# Patient Record
Sex: FEMALE | Race: WHITE | URBAN - METROPOLITAN AREA
[De-identification: names, ages, dates, MRNs, and addresses within clinical notes are randomized per-mention and may not be internally consistent; named-entity substitution may affect disease eponyms.]

---

## 2023-07-19 ENCOUNTER — OFFICE VISIT (OUTPATIENT)
Dept: ENDOCRINOLOGY | Age: 53
End: 2023-07-19
Payer: COMMERCIAL

## 2023-07-19 VITALS
WEIGHT: 116.2 LBS | HEART RATE: 64 BPM | HEIGHT: 63 IN | SYSTOLIC BLOOD PRESSURE: 113 MMHG | BODY MASS INDEX: 20.59 KG/M2 | OXYGEN SATURATION: 99 % | DIASTOLIC BLOOD PRESSURE: 79 MMHG

## 2023-07-19 DIAGNOSIS — K90.0 CELIAC DISEASE: ICD-10-CM

## 2023-07-19 DIAGNOSIS — M81.0 OSTEOPOROSIS, POSTMENOPAUSAL: Primary | ICD-10-CM

## 2023-07-19 PROCEDURE — 99417 PROLNG OP E/M EACH 15 MIN: CPT | Performed by: INTERNAL MEDICINE

## 2023-07-19 PROCEDURE — 99205 OFFICE O/P NEW HI 60 MIN: CPT | Performed by: INTERNAL MEDICINE

## 2023-07-20 ENCOUNTER — TELEPHONE (OUTPATIENT)
Dept: ENDOCRINOLOGY | Age: 53
End: 2023-07-20

## 2023-07-20 NOTE — TELEPHONE ENCOUNTER
Call from pt's PCP- Dr Masoud Dela Cruz would like to talk to Dr Priscilla Oneill about this mutual patient.  Not urgent just at your convenience    CB# for Dr Culver Come # [phone]

## 2023-07-21 LAB — MISCELLANEOUS LAB TEST ORDER: NORMAL

## 2024-02-29 ENCOUNTER — TELEPHONE (OUTPATIENT)
Dept: ENDOCRINOLOGY | Age: 54
End: 2024-02-29

## 2024-02-29 NOTE — TELEPHONE ENCOUNTER
Pt called to schedule her 1yr follow up for Dr Almaguer in July 2024. Pt states already had dexa done @ [facility] on 2/23/24 (in [facility]). Instructed to pt Dr Almaguer may want her to bring in the report and disc to review. If pt is not suppose to bring disc & report she will need called to let her know    # [phone]

## 2024-06-25 ENCOUNTER — TELEPHONE (OUTPATIENT)
Dept: ENDOCRINOLOGY | Age: 54
End: 2024-06-25

## 2024-06-25 NOTE — TELEPHONE ENCOUNTER
Patient had DXA scan in February as [facility]. Wants to know if Dr Almaguer can access those images or if she needs to  a disc from the imaging center.     Please advise.

## 2024-07-29 ENCOUNTER — OFFICE VISIT (OUTPATIENT)
Dept: ENDOCRINOLOGY | Age: 54
End: 2024-07-29
Payer: COMMERCIAL

## 2024-07-29 VITALS — BODY MASS INDEX: 21.35 KG/M2 | WEIGHT: 116 LBS | HEIGHT: 62 IN

## 2024-07-29 DIAGNOSIS — K90.0 CELIAC DISEASE: ICD-10-CM

## 2024-07-29 DIAGNOSIS — M81.0 OSTEOPOROSIS, POSTMENOPAUSAL: Primary | ICD-10-CM

## 2024-07-29 PROCEDURE — 99215 OFFICE O/P EST HI 40 MIN: CPT | Performed by: INTERNAL MEDICINE

## 2024-07-29 RX ORDER — PHENOL 1.4 %
1 AEROSOL, SPRAY (ML) MUCOUS MEMBRANE DAILY
COMMUNITY

## 2024-07-29 RX ORDER — ALENDRONATE SODIUM 70 MG/1
70 TABLET ORAL WEEKLY
Qty: 12 TABLET | Refills: 4 | Status: SHIPPED | OUTPATIENT
Start: 2024-07-29

## 2024-07-29 NOTE — PROGRESS NOTES
[facility]  [address] [place] [address] [address] [place] [address]  Phone [phone]  Fax [phone]    NAME:  HORNE-TRAUBERT, JULIE  :  1970  CONSULT DATE:    2023  MOST RECENT VISIT:  2023  TODAY'S DATE:  2024    Labs @ [facility] 2024    PROBLEMS.  Low bone density by DXA 10/2014, T-scores -1.8 in the spine, -1.2 in the left femoral neck    Family history of osteoporosis, new  - mother with hip and multiple vertebral fractures.    BMD decreased [id number], T-score -2.9 in the spine, -2.4 in the left femoral neck.  Natural menopause age 50  Celiac disease diagnosed , TTG 3 U/mL 2017    CURRENT MANAGEMENT FOR BONE HEALTH/OSTEOPOROSIS.  Calcium, 300 mg from low calcium foods, 900 mg almond milk, 300 mg cheese  Gluten-free   diet MVI Ca+D other    Calcium 1500    mg/d   Vitamin D    2000 QOD IU/d     25-OH D 31 ng/mL 2023 (desirable is 30-60 ng/mL)  Exercise, group exercises [employer] 4 x weekly, walking  Pharmacologic therapy: none    PREVIOUS BONE-ACTIVE MEDICATIONS. none    OTHER CURRENT MEDICATIONS (SELECTED): mirtazapine  OTC MEDICATIONS (SELECTED): none    CHIEF COMPLAINT. Here for f/u of osteoporosis, decreasing BMD. No new related signs or symptoms.     INTERVAL HISTORY:  See problem list for chronic/inactive conditions.  No falls, near-falls or fractures.  Feels well overall. New family history as above.     FOR FULL DETAILS OF FAMILY HISTORY, PAST MEDICAL AND SURGICAL HISTORY, SOCIAL HISTORY, SEE PATIENT QUESTIONNAIRE OF TODAY'S DATE.    PHYSICAL EXAMINATION.   GENERAL.  Well-nourished, well-developed, normally proportioned adult.   MENTAL STATUS. Pleasant mood.  Oriented to time, place, and person.  ORAL. Teeth appear to be in good condition.  MUSCULOSKELETAL.  Spinal contours are normal.  No spine tenderness to palpation or percussion.   Three finger spaces between ribs and pelvis.  Gait steady without assistance.

## 2025-04-14 ENCOUNTER — TELEPHONE (OUTPATIENT)
Dept: ENDOCRINOLOGY | Age: 55
End: 2025-04-14

## 2025-07-02 DIAGNOSIS — M81.0 OSTEOPOROSIS, POSTMENOPAUSAL: Primary | ICD-10-CM

## 2025-07-30 PROBLEM — M81.0 OSTEOPOROSIS, POSTMENOPAUSAL: Status: ACTIVE | Noted: 2025-07-30

## 2025-07-30 PROBLEM — K90.0 CELIAC DISEASE: Status: ACTIVE | Noted: 2025-07-30

## 2025-07-30 PROBLEM — Z51.81 MEDICATION MONITORING ENCOUNTER: Status: ACTIVE | Noted: 2025-07-30

## 2025-08-11 ENCOUNTER — HOSPITAL ENCOUNTER (OUTPATIENT)
Dept: GENERAL RADIOLOGY | Age: 55
Discharge: HOME | End: 2025-08-11
Payer: COMMERCIAL

## 2025-08-11 ENCOUNTER — OFFICE VISIT (OUTPATIENT)
Dept: ENDOCRINOLOGY | Age: 55
End: 2025-08-11
Payer: COMMERCIAL

## 2025-08-11 VITALS — HEIGHT: 63 IN | WEIGHT: 112 LBS | BODY MASS INDEX: 19.84 KG/M2

## 2025-08-11 DIAGNOSIS — M81.0 OSTEOPOROSIS, POSTMENOPAUSAL: Primary | ICD-10-CM

## 2025-08-11 DIAGNOSIS — Z51.81 MEDICATION MONITORING ENCOUNTER: ICD-10-CM

## 2025-08-11 DIAGNOSIS — K90.0 CELIAC DISEASE: ICD-10-CM

## 2025-08-11 DIAGNOSIS — M81.0 OSTEOPOROSIS, POSTMENOPAUSAL: ICD-10-CM

## 2025-08-11 PROCEDURE — 77080 DXA BONE DENSITY AXIAL: CPT

## 2025-08-11 PROCEDURE — 77080 DXA BONE DENSITY AXIAL: CPT | Performed by: INTERNAL MEDICINE

## 2025-08-11 PROCEDURE — 99214 OFFICE O/P EST MOD 30 MIN: CPT | Performed by: INTERNAL MEDICINE

## 2025-08-11 RX ORDER — ALENDRONATE SODIUM 70 MG/1
70 TABLET ORAL WEEKLY
Qty: 12 TABLET | Refills: 4 | Status: SHIPPED | OUTPATIENT
Start: 2025-08-11